# Patient Record
Sex: MALE | Race: WHITE | NOT HISPANIC OR LATINO | URBAN - METROPOLITAN AREA
[De-identification: names, ages, dates, MRNs, and addresses within clinical notes are randomized per-mention and may not be internally consistent; named-entity substitution may affect disease eponyms.]

---

## 2020-06-02 ENCOUNTER — TELEPHONE ENCOUNTER (OUTPATIENT)
Dept: URBAN - METROPOLITAN AREA CLINIC 68 | Facility: CLINIC | Age: 70
End: 2020-06-02

## 2020-06-09 LAB
ALBUMIN/GLOBULIN RATIO: (no result)
ALBUMIN/GLOBULIN RATIO: (no result)
ALBUMIN: (no result)
ALBUMIN: (no result)
ALKALINE PHOSPHATASE: (no result)
ALKALINE PHOSPHATASE: (no result)
ALT: (no result)
ALT: (no result)
AST: (no result)
AST: (no result)
BILIRUBIN, TOTAL: (no result)
BILIRUBIN, TOTAL: (no result)
BUN/CREATININE RATIO: (no result)
BUN/CREATININE RATIO: (no result)
CALCIUM: (no result)
CALCIUM: (no result)
CARBON DIOXIDE: (no result)
CARBON DIOXIDE: (no result)
CHLORIDE: (no result)
CHLORIDE: (no result)
CREATININE: (no result)
CREATININE: (no result)
EGFR AFRICAN AMERICAN: (no result)
EGFR AFRICAN AMERICAN: (no result)
EGFR NON-AFR. AMERICAN: (no result)
EGFR NON-AFR. AMERICAN: (no result)
GLOBULIN: (no result)
GLOBULIN: (no result)
GLUCOSE: (no result)
GLUCOSE: (no result)
HEMATOCRIT: (no result)
HEMATOCRIT: (no result)
HEMOGLOBIN: (no result)
HEMOGLOBIN: (no result)
MCH: (no result)
MCH: (no result)
MCHC: (no result)
MCHC: (no result)
MCV: (no result)
MCV: (no result)
MPV: (no result)
MPV: (no result)
PLATELET COUNT: (no result)
PLATELET COUNT: (no result)
POTASSIUM: (no result)
POTASSIUM: (no result)
PROTEIN, TOTAL: (no result)
PROTEIN, TOTAL: (no result)
RDW: (no result)
RDW: (no result)
RED BLOOD CELL COUNT: (no result)
RED BLOOD CELL COUNT: (no result)
SED RATE BY MODIFIED WESTERGREN: (no result)
SED RATE BY MODIFIED WESTERGREN: (no result)
SODIUM: (no result)
SODIUM: (no result)
UREA NITROGEN (BUN): (no result)
UREA NITROGEN (BUN): (no result)
WHITE BLOOD CELL COUNT: (no result)
WHITE BLOOD CELL COUNT: (no result)

## 2020-06-10 ENCOUNTER — TELEPHONE ENCOUNTER (OUTPATIENT)
Dept: URBAN - METROPOLITAN AREA CLINIC 68 | Facility: CLINIC | Age: 70
End: 2020-06-10

## 2020-06-10 ENCOUNTER — LAB OUTSIDE AN ENCOUNTER (OUTPATIENT)
Dept: URBAN - METROPOLITAN AREA CLINIC 68 | Facility: CLINIC | Age: 70
End: 2020-06-10

## 2020-07-21 ENCOUNTER — TELEPHONE ENCOUNTER (OUTPATIENT)
Dept: URBAN - METROPOLITAN AREA CLINIC 68 | Facility: CLINIC | Age: 70
End: 2020-07-21

## 2020-07-28 ENCOUNTER — TELEPHONE ENCOUNTER (OUTPATIENT)
Dept: URBAN - METROPOLITAN AREA CLINIC 68 | Facility: CLINIC | Age: 70
End: 2020-07-28

## 2020-07-30 ENCOUNTER — TELEPHONE ENCOUNTER (OUTPATIENT)
Dept: URBAN - METROPOLITAN AREA CLINIC 68 | Facility: CLINIC | Age: 70
End: 2020-07-30

## 2020-07-31 LAB — COPY(IES) SENT TO:: (no result)

## 2020-08-01 ENCOUNTER — LAB OUTSIDE AN ENCOUNTER (OUTPATIENT)
Dept: URBAN - METROPOLITAN AREA CLINIC 68 | Facility: CLINIC | Age: 70
End: 2020-08-01

## 2020-08-01 LAB
HEMATOCRIT: (no result)
HEMOGLOBIN: (no result)
MCH: (no result)
MCHC: (no result)
MCV: (no result)
MPV: (no result)
PLATELET COUNT: (no result)
RDW: (no result)
RED BLOOD CELL COUNT: (no result)
WHITE BLOOD CELL COUNT: (no result)

## 2020-08-03 ENCOUNTER — OFFICE VISIT (OUTPATIENT)
Dept: URBAN - METROPOLITAN AREA CLINIC 68 | Facility: CLINIC | Age: 70
End: 2020-08-03

## 2020-08-03 ENCOUNTER — TELEPHONE ENCOUNTER (OUTPATIENT)
Dept: URBAN - METROPOLITAN AREA CLINIC 68 | Facility: CLINIC | Age: 70
End: 2020-08-03

## 2020-08-03 LAB
HEMATOCRIT: (no result)
HEMATOCRIT: (no result)
HEMOGLOBIN: (no result)
HEMOGLOBIN: (no result)
MCH: (no result)
MCH: (no result)
MCHC: (no result)
MCHC: (no result)
MCV: (no result)
MCV: (no result)
MPV: (no result)
MPV: (no result)
PLATELET COUNT: (no result)
PLATELET COUNT: (no result)
RDW: (no result)
RDW: (no result)
RED BLOOD CELL COUNT: (no result)
RED BLOOD CELL COUNT: (no result)
WHITE BLOOD CELL COUNT: (no result)
WHITE BLOOD CELL COUNT: (no result)

## 2020-08-04 ENCOUNTER — LAB OUTSIDE AN ENCOUNTER (OUTPATIENT)
Dept: URBAN - METROPOLITAN AREA CLINIC 68 | Facility: CLINIC | Age: 70
End: 2020-08-04

## 2020-08-04 ENCOUNTER — TELEPHONE ENCOUNTER (OUTPATIENT)
Dept: URBAN - METROPOLITAN AREA CLINIC 68 | Facility: CLINIC | Age: 70
End: 2020-08-04

## 2020-08-05 ENCOUNTER — TELEPHONE ENCOUNTER (OUTPATIENT)
Dept: URBAN - METROPOLITAN AREA CLINIC 68 | Facility: CLINIC | Age: 70
End: 2020-08-05

## 2020-08-06 ENCOUNTER — OFFICE VISIT (OUTPATIENT)
Dept: URBAN - METROPOLITAN AREA MEDICAL CENTER 21 | Facility: MEDICAL CENTER | Age: 70
End: 2020-08-06

## 2020-08-10 LAB
COPY(IES) SENT TO:: (no result)
COPY(IES) SENT TO:: (no result)
SED RATE BY MODIFIED WESTERGREN: (no result)
SED RATE BY MODIFIED WESTERGREN: (no result)

## 2020-08-11 ENCOUNTER — LAB OUTSIDE AN ENCOUNTER (OUTPATIENT)
Dept: URBAN - METROPOLITAN AREA CLINIC 68 | Facility: CLINIC | Age: 70
End: 2020-08-11

## 2020-08-11 ENCOUNTER — TELEPHONE ENCOUNTER (OUTPATIENT)
Dept: URBAN - METROPOLITAN AREA CLINIC 68 | Facility: CLINIC | Age: 70
End: 2020-08-11

## 2020-08-11 LAB
ALBUMIN/GLOBULIN RATIO: (no result)
ALBUMIN/GLOBULIN RATIO: (no result)
ALBUMIN: (no result)
ALBUMIN: (no result)
ALKALINE PHOSPHATASE: (no result)
ALKALINE PHOSPHATASE: (no result)
ALT: (no result)
ALT: (no result)
AST: (no result)
AST: (no result)
BILIRUBIN, TOTAL: (no result)
BILIRUBIN, TOTAL: (no result)
BUN/CREATININE RATIO: (no result)
BUN/CREATININE RATIO: (no result)
CALCIUM: (no result)
CALCIUM: (no result)
CARBON DIOXIDE: (no result)
CARBON DIOXIDE: (no result)
CHLORIDE: (no result)
CHLORIDE: (no result)
CREATININE: (no result)
CREATININE: (no result)
EGFR AFRICAN AMERICAN: (no result)
EGFR AFRICAN AMERICAN: (no result)
EGFR NON-AFR. AMERICAN: (no result)
EGFR NON-AFR. AMERICAN: (no result)
GLOBULIN: (no result)
GLOBULIN: (no result)
GLUCOSE: (no result)
GLUCOSE: (no result)
HEMATOCRIT: (no result)
HEMATOCRIT: (no result)
HEMOGLOBIN: (no result)
HEMOGLOBIN: (no result)
MCH: (no result)
MCH: (no result)
MCHC: (no result)
MCHC: (no result)
MCV: (no result)
MCV: (no result)
MPV: (no result)
MPV: (no result)
PLATELET COUNT: (no result)
PLATELET COUNT: (no result)
POTASSIUM: (no result)
POTASSIUM: (no result)
PROTEIN, TOTAL: (no result)
PROTEIN, TOTAL: (no result)
RDW: (no result)
RDW: (no result)
RED BLOOD CELL COUNT: (no result)
RED BLOOD CELL COUNT: (no result)
SODIUM: (no result)
SODIUM: (no result)
UREA NITROGEN (BUN): (no result)
UREA NITROGEN (BUN): (no result)
WHITE BLOOD CELL COUNT: (no result)
WHITE BLOOD CELL COUNT: (no result)

## 2020-08-19 ENCOUNTER — TELEPHONE ENCOUNTER (OUTPATIENT)
Dept: URBAN - METROPOLITAN AREA CLINIC 68 | Facility: CLINIC | Age: 70
End: 2020-08-19

## 2020-08-20 LAB
COPY(IES) SENT TO:: (no result)
COPY(IES) SENT TO:: (no result)

## 2020-08-21 ENCOUNTER — LAB OUTSIDE AN ENCOUNTER (OUTPATIENT)
Dept: URBAN - METROPOLITAN AREA CLINIC 68 | Facility: CLINIC | Age: 70
End: 2020-08-21

## 2020-08-21 ENCOUNTER — TELEPHONE ENCOUNTER (OUTPATIENT)
Dept: URBAN - METROPOLITAN AREA CLINIC 68 | Facility: CLINIC | Age: 70
End: 2020-08-21

## 2020-08-26 ENCOUNTER — OFFICE VISIT (OUTPATIENT)
Dept: URBAN - METROPOLITAN AREA CLINIC 68 | Facility: CLINIC | Age: 70
End: 2020-08-26

## 2020-09-02 ENCOUNTER — TELEPHONE ENCOUNTER (OUTPATIENT)
Dept: URBAN - METROPOLITAN AREA CLINIC 68 | Facility: CLINIC | Age: 70
End: 2020-09-02

## 2020-09-05 ENCOUNTER — LAB OUTSIDE AN ENCOUNTER (OUTPATIENT)
Dept: URBAN - METROPOLITAN AREA CLINIC 68 | Facility: CLINIC | Age: 70
End: 2020-09-05

## 2020-09-10 ENCOUNTER — OFFICE VISIT (OUTPATIENT)
Dept: URBAN - METROPOLITAN AREA MEDICAL CENTER 21 | Facility: MEDICAL CENTER | Age: 70
End: 2020-09-10

## 2020-09-15 ENCOUNTER — TELEPHONE ENCOUNTER (OUTPATIENT)
Dept: URBAN - METROPOLITAN AREA CLINIC 68 | Facility: CLINIC | Age: 70
End: 2020-09-15

## 2020-09-18 ENCOUNTER — OFFICE VISIT (OUTPATIENT)
Dept: URBAN - METROPOLITAN AREA CLINIC 68 | Facility: CLINIC | Age: 70
End: 2020-09-18

## 2020-10-19 ENCOUNTER — OFFICE VISIT (OUTPATIENT)
Dept: URBAN - METROPOLITAN AREA CLINIC 68 | Facility: CLINIC | Age: 70
End: 2020-10-19

## 2020-10-27 ENCOUNTER — TELEPHONE ENCOUNTER (OUTPATIENT)
Dept: URBAN - METROPOLITAN AREA CLINIC 68 | Facility: CLINIC | Age: 70
End: 2020-10-27

## 2020-11-02 LAB — DUPLICATE REPORT REQUEST: (no result)

## 2020-11-03 ENCOUNTER — LAB OUTSIDE AN ENCOUNTER (OUTPATIENT)
Dept: URBAN - METROPOLITAN AREA CLINIC 68 | Facility: CLINIC | Age: 70
End: 2020-11-03

## 2020-11-03 LAB
ALBUMIN/GLOBULIN RATIO: (no result)
ALBUMIN: (no result)
ALKALINE PHOSPHATASE: (no result)
ALT: (no result)
AST: (no result)
BILIRUBIN, TOTAL: (no result)
BUN/CREATININE RATIO: (no result)
CALCIUM: (no result)
CARBON DIOXIDE: (no result)
CHLORIDE: (no result)
CREATININE: (no result)
EGFR AFRICAN AMERICAN: (no result)
EGFR NON-AFR. AMERICAN: (no result)
GLOBULIN: (no result)
GLUCOSE: (no result)
HEMATOCRIT: (no result)
HEMOGLOBIN: (no result)
MCH: (no result)
MCHC: (no result)
MCV: (no result)
MPV: (no result)
PLATELET COUNT: (no result)
POTASSIUM: (no result)
PROTEIN, TOTAL: (no result)
RDW: (no result)
RED BLOOD CELL COUNT: (no result)
SODIUM: (no result)
UREA NITROGEN (BUN): (no result)
WHITE BLOOD CELL COUNT: (no result)

## 2020-11-05 ENCOUNTER — TELEPHONE ENCOUNTER (OUTPATIENT)
Dept: URBAN - METROPOLITAN AREA CLINIC 68 | Facility: CLINIC | Age: 70
End: 2020-11-05

## 2020-12-22 ENCOUNTER — LAB OUTSIDE AN ENCOUNTER (OUTPATIENT)
Dept: URBAN - METROPOLITAN AREA CLINIC 68 | Facility: CLINIC | Age: 70
End: 2020-12-22

## 2020-12-29 ENCOUNTER — TELEPHONE ENCOUNTER (OUTPATIENT)
Dept: URBAN - METROPOLITAN AREA CLINIC 68 | Facility: CLINIC | Age: 70
End: 2020-12-29

## 2021-04-06 ENCOUNTER — OFFICE VISIT (OUTPATIENT)
Age: 71
End: 2021-04-06

## 2022-05-06 ENCOUNTER — NEW PATIENT (OUTPATIENT)
Dept: URBAN - METROPOLITAN AREA CLINIC 33 | Facility: CLINIC | Age: 72
End: 2022-05-06

## 2022-05-06 VITALS
DIASTOLIC BLOOD PRESSURE: 96 MMHG | BODY MASS INDEX: 25.62 KG/M2 | SYSTOLIC BLOOD PRESSURE: 164 MMHG | HEIGHT: 70 IN | HEART RATE: 62 BPM | WEIGHT: 179 LBS

## 2022-05-06 DIAGNOSIS — H35.62: ICD-10-CM

## 2022-05-06 DIAGNOSIS — H04.123: ICD-10-CM

## 2022-05-06 DIAGNOSIS — H35.81: ICD-10-CM

## 2022-05-06 DIAGNOSIS — H35.052: ICD-10-CM

## 2022-05-06 DIAGNOSIS — Z79.4: ICD-10-CM

## 2022-05-06 DIAGNOSIS — E11.9: ICD-10-CM

## 2022-05-06 DIAGNOSIS — D86.9: ICD-10-CM

## 2022-05-06 DIAGNOSIS — H40.013: ICD-10-CM

## 2022-05-06 DIAGNOSIS — H35.371: ICD-10-CM

## 2022-05-06 PROCEDURE — 92235 FLUORESCEIN ANGRPH MLTIFRAME: CPT

## 2022-05-06 PROCEDURE — 99204 OFFICE O/P NEW MOD 45 MIN: CPT

## 2022-05-06 PROCEDURE — 92134 CPTRZ OPH DX IMG PST SGM RTA: CPT

## 2022-05-06 ASSESSMENT — TONOMETRY
OS_IOP_MMHG: 10
OD_IOP_MMHG: 9

## 2022-05-06 ASSESSMENT — VISUAL ACUITY
OD_SC: 20/100-1
OD_PH: 20/40-1

## 2022-05-06 NOTE — PATIENT DISCUSSION
5/6/22: SUSPECTED CNV, HOWEVER GIVEN OPTIC NERVE EDEMA, WILL RULE OUT INFECTIONS FIRST-WITH SIGNIFICANT HISTORY OF AUTOIMMUNE DX + DIABETES-BEFORE CONSIDERING ANTIVEGF TREATMENT.  IContinue to MONITOR CLOSELY to determine the need for TREATMENT and INCREASE/DECREASE in length of time till next follow up visit.

## 2022-06-04 ENCOUNTER — TELEPHONE ENCOUNTER (OUTPATIENT)
Dept: URBAN - METROPOLITAN AREA CLINIC 68 | Facility: CLINIC | Age: 72
End: 2022-06-04

## 2022-06-04 RX ORDER — COLCHICINE 0.6 MG/1
COLCHICINE( 0.6MG ORAL 1/2 AS NEEDED ) DISCONTINUED -HX ENTRY TABLET, FILM COATED ORAL AS NEEDED
OUTPATIENT
Start: 2020-10-19 | End: 2020-10-19

## 2022-06-04 RX ORDER — CLOPIDOGREL 75 MG/1
CLOPIDOGREL BISULFATE( 75MG ORAL  DAILY ) INACTIVE -HX ENTRY TABLET ORAL DAILY
OUTPATIENT
Start: 2019-12-04

## 2022-06-04 RX ORDER — ALLOPURINOL 300 MG/1
ALLOPURINOL( 300MG ORAL  DAILY ) DISCONTINUED -HX ENTRY TABLET ORAL DAILY
OUTPATIENT
Start: 2020-10-19 | End: 2020-10-19

## 2022-06-04 RX ORDER — TRANDOLAPRIL 1 MG/1
TRANDOLAPRIL( 1MG ORAL  DAILY ) INACTIVE -HX ENTRY TABLET ORAL DAILY
OUTPATIENT
Start: 2019-12-04

## 2022-06-04 RX ORDER — METFORMIN HYDROCHLORIDE 500 MG/1
METFORMIN HCL( 500MG ORAL  TWO TIMES DAILY ) INACTIVE -HX ENTRY TABLET, COATED ORAL
OUTPATIENT
Start: 2019-12-04

## 2022-06-04 RX ORDER — ALBUTEROL SULFATE 90 UG/1
PROAIR HFA( 108 (90 BASE)MCG/ACT INHALATION   ) UNDEFINED -HX ENTRY AEROSOL, METERED RESPIRATORY (INHALATION)
OUTPATIENT
Start: 2017-03-10

## 2022-06-04 RX ORDER — PANTOPRAZOLE SODIUM 40 MG/1
PANTOPRAZOLE SODIUM( 40MG ORAL 1 EVERY TWENTY-FOUR HOURS ) UNDEFINED -HX ENTRY TABLET, DELAYED RELEASE ORAL
OUTPATIENT
Start: 2017-03-10

## 2022-06-04 RX ORDER — NITROGLYCERIN 0.4 MG/1
NITROGLYCERIN( 0.4MG SUBLINGUAL   ) UNDEFINED -HX ENTRY TABLET SUBLINGUAL
OUTPATIENT
Start: 2017-03-10

## 2022-06-04 RX ORDER — OMEPRAZOLE 20 MG/1
CAPSULE, DELAYED RELEASE ORAL DAILY
Qty: 14 | Refills: 0 | OUTPATIENT
Start: 2020-05-02 | End: 2020-05-16

## 2022-06-04 RX ORDER — WARFARIN 7.5 MG/1
WARFARIN SODIUM( 7.5MG ORAL  4 X/WEEK ) DISCONTINUED -HX ENTRY TABLET ORAL
OUTPATIENT
Start: 2020-10-19 | End: 2020-10-19

## 2022-06-04 RX ORDER — DILTIAZEM HYDROCHLORIDE 240 MG/1
TAZTIA XT( 240MG ORAL  DAILY ) INACTIVE -HX ENTRY CAPSULE, EXTENDED RELEASE ORAL DAILY
OUTPATIENT
Start: 2019-12-04

## 2022-06-04 RX ORDER — METHYLPREDNISOLONE 4 MG/1
METHYLPREDNISOLONE( 4MG ORAL  DAILY ) DISCONTINUED -HX ENTRY TABLET ORAL DAILY
OUTPATIENT
Start: 2020-10-19 | End: 2020-10-19

## 2022-06-04 RX ORDER — ASPIRIN 81 MG/1
ASPIRIN( 81MG ORAL 1  ) UNDEFINED -HX ENTRY TABLET, COATED ORAL
OUTPATIENT
Start: 2017-03-10

## 2022-06-04 RX ORDER — INFLIXIMAB 100 MG/10ML
INJECTION, POWDER, LYOPHILIZED, FOR SOLUTION INTRAVENOUS
OUTPATIENT
Start: 2020-07-21

## 2022-06-05 ENCOUNTER — TELEPHONE ENCOUNTER (OUTPATIENT)
Dept: URBAN - METROPOLITAN AREA CLINIC 68 | Facility: CLINIC | Age: 72
End: 2022-06-05

## 2022-06-05 RX ORDER — GOLIMUMAB 50 MG/4ML
SIMPONI ARIA( 50MG/4ML INTRAVENOUS  AS DIRECTED ) ACTIVE -HX ENTRY SOLUTION INTRAVENOUS AS DIRECTED
Status: ACTIVE | COMMUNITY
Start: 2020-09-18

## 2022-06-05 RX ORDER — LEVOTHYROXINE SODIUM 125 UG/1
LEVOTHYROXINE SODIUM( 125MCG ORAL  DAILY ) ACTIVE -HX ENTRY TABLET ORAL DAILY
Status: ACTIVE | COMMUNITY
Start: 2020-10-19

## 2022-06-05 RX ORDER — EZETIMIBE 10 MG/1
ZETIA( 10MG ORAL  DAILY ) ACTIVE -HX ENTRY TABLET ORAL DAILY
Status: ACTIVE | COMMUNITY
Start: 2020-10-19

## 2022-06-05 RX ORDER — INSULIN LISPRO 100 [IU]/ML
HUMALOG( 100UNIT/ML SUBCUTANEOUS  AS DIRECTED ) ACTIVE -HX ENTRY INJECTION, SOLUTION INTRAVENOUS; SUBCUTANEOUS AS DIRECTED
Status: ACTIVE | COMMUNITY
Start: 2020-10-19

## 2022-06-05 RX ORDER — CARVEDILOL 12.5 MG/1
CARVEDILOL( 12.5MG ORAL 1/2 TABLET TWO TIMES DAILY ) ACTIVE -HX ENTRY TABLET, FILM COATED ORAL
Status: ACTIVE | COMMUNITY
Start: 2020-10-19

## 2022-06-05 RX ORDER — ALENDRONATE SODIUM 70 MG/1
ALENDRONATE SODIUM( 70MG ORAL  WEEKLY ) ACTIVE -HX ENTRY TABLET ORAL WEEKLY
Status: ACTIVE | COMMUNITY
Start: 2020-10-19

## 2022-06-05 RX ORDER — FUROSEMIDE 80 MG/1
LASIX( 80MG ORAL  DAILY ) ACTIVE -HX ENTRY TABLET ORAL DAILY
Status: ACTIVE | COMMUNITY
Start: 2020-10-19

## 2022-06-05 RX ORDER — INSULIN DETEMIR 100 [IU]/ML
LEVEMIR( 100UNIT/ML SUBCUTANEOUS 26UNITS DAILY ) ACTIVE -HX ENTRY INJECTION, SOLUTION SUBCUTANEOUS DAILY
Status: ACTIVE | COMMUNITY
Start: 2020-10-19

## 2022-06-05 RX ORDER — SACUBITRIL AND VALSARTAN 49; 51 MG/1; MG/1
ENTRESTO( 49-51MG ORAL  TWO TIMES DAILY ) ACTIVE -HX ENTRY TABLET, FILM COATED ORAL
Status: ACTIVE | COMMUNITY
Start: 2020-10-19

## 2022-06-05 RX ORDER — PREDNISONE 10 MG/1
PREDNISONE( 10MG ORAL 3 TABLETS DAILY ) ACTIVE -HX ENTRY TABLET ORAL DAILY
Status: ACTIVE | COMMUNITY
Start: 2020-10-19

## 2022-06-05 RX ORDER — PRAVASTATIN SODIUM 80 MG/1
PRAVASTATIN SODIUM( 80MG ORAL  DAILY ) ACTIVE -HX ENTRY TABLET ORAL DAILY
Status: ACTIVE | COMMUNITY
Start: 2020-10-19

## 2022-06-05 RX ORDER — WARFARIN 5 MG/1
WARFARIN SODIUM( 5MG ORAL  THREE TIMES A WEEK ) ACTIVE -HX ENTRY TABLET ORAL
Status: ACTIVE | COMMUNITY
Start: 2020-09-18

## 2022-06-05 RX ORDER — NITROGLYCERIN 0.4 MG/1
NITROSTAT( 0.4MG SUBLINGUAL  AS NEEDED ) ACTIVE -HX ENTRY TABLET SUBLINGUAL AS NEEDED
Status: ACTIVE | COMMUNITY
Start: 2020-10-19

## 2022-06-05 RX ORDER — PANTOPRAZOLE SODIUM 40 MG/1
TABLET, DELAYED RELEASE ORAL
Qty: 180 | Refills: 180 | Status: ACTIVE | COMMUNITY
Start: 2021-10-31

## 2022-06-10 ENCOUNTER — FOLLOW UP (OUTPATIENT)
Dept: URBAN - METROPOLITAN AREA CLINIC 33 | Facility: CLINIC | Age: 72
End: 2022-06-10

## 2022-06-10 DIAGNOSIS — H35.371: ICD-10-CM

## 2022-06-10 DIAGNOSIS — H35.62: ICD-10-CM

## 2022-06-10 DIAGNOSIS — H35.052: ICD-10-CM

## 2022-06-10 DIAGNOSIS — E11.9: ICD-10-CM

## 2022-06-10 DIAGNOSIS — Z79.4: ICD-10-CM

## 2022-06-10 DIAGNOSIS — H04.123: ICD-10-CM

## 2022-06-10 DIAGNOSIS — H35.81: ICD-10-CM

## 2022-06-10 DIAGNOSIS — D86.9: ICD-10-CM

## 2022-06-10 DIAGNOSIS — H40.013: ICD-10-CM

## 2022-06-10 PROCEDURE — 67028 INJECTION EYE DRUG: CPT

## 2022-06-10 PROCEDURE — J3490AVA AVASTIN

## 2022-06-10 PROCEDURE — 92012 INTRM OPH EXAM EST PATIENT: CPT

## 2022-06-10 PROCEDURE — 92134 CPTRZ OPH DX IMG PST SGM RTA: CPT

## 2022-06-10 ASSESSMENT — TONOMETRY
OD_IOP_MMHG: 11
OS_IOP_MMHG: 13

## 2022-06-10 ASSESSMENT — VISUAL ACUITY: OD_CC: 20/40

## 2022-06-10 NOTE — PROCEDURE NOTE: CLINICAL
PROCEDURE NOTE: Avastin () OS. Diagnosis: Choroidal Neovascular Membrane, Peripapillary. Anesthesia: Lidocaine 4%. Prep: Betadine Drops. Prior to injection, risks/benefits/alternatives discussed including infection, loss of vision, hemorrhage, cataract, glaucoma, retinal tears or detachment. The off-label status of Intravitreal Avastin also was reviewed. The patient wished to proceed with treatment. Topical anesthesia was induced with Alcaine. Additional anesthesia was achieved using drop(s) or injection checked above. a drop of Povidone-iodine 5% ophthalmic solution was instilled over the injection site and in the inferior fornix. Betadine prep was performed. Using the syringe provided, Avastin 1.25 mg in 0.05 cc was injected into the vitreous cavity. The needle was passed 3.0 mm posterior to the limbus in pseudophakic patients, and 3.5 mm posterior to the lumbus in phakic patients. The remainder of the Avastin in the single-use vial was then discarded in a medical waste disposal container. Unused medication was discarded. Patient tolerated procedure well. There were no complications. Injection time: 445 PM. Post-op instructions given. Expiration Date: 8/25/2022. The patient was instructed to return for re-evaluation in approximately 4-12 weeks depending on his/her condition and was told to call immediately if vision decreases and/or if his/her eye becomes red, painful, and/or light sensitive. The patient was instructed to go to the emergency room or call 911 if unable to reach the doctor within an hour or two of trying or calling. The patient was instructed to use Artificial Tears q.i.d. p.r.n for comfort. Satnam Haines

## 2022-06-14 ENCOUNTER — OFFICE VISIT (OUTPATIENT)
Dept: URBAN - METROPOLITAN AREA CLINIC 68 | Facility: CLINIC | Age: 72
End: 2022-06-14

## 2022-06-20 ENCOUNTER — OFFICE VISIT (OUTPATIENT)
Dept: URBAN - METROPOLITAN AREA CLINIC 68 | Facility: CLINIC | Age: 72
End: 2022-06-20

## 2022-06-25 ENCOUNTER — TELEPHONE ENCOUNTER (OUTPATIENT)
Age: 72
End: 2022-06-25

## 2022-06-25 RX ORDER — METFORMIN HCL 500 MG/1
METFORMIN HCL( 500MG ORAL  TWO TIMES DAILY ) INACTIVE -HX ENTRY TABLET ORAL
OUTPATIENT
Start: 2019-12-04

## 2022-06-25 RX ORDER — PANTOPRAZOLE 40 MG/1
PANTOPRAZOLE SODIUM( 40MG ORAL 1 EVERY TWENTY-FOUR HOURS ) UNDEFINED -HX ENTRY TABLET, DELAYED RELEASE ORAL
OUTPATIENT
Start: 2017-03-10

## 2022-06-25 RX ORDER — DILTIAZEM HYDROCHLORIDE EXTENDED RELEASE 240 MG/1
DILTIAZEM CD( 240MG ORAL   ) UNDEFINED -HX ENTRY CAPSULE, EXTENDED RELEASE ORAL
OUTPATIENT
Start: 2017-03-10

## 2022-06-25 RX ORDER — METHYLPREDNISOLONE 4 MG/1
METHYLPREDNISOLONE( 4MG ORAL  DAILY ) DISCONTINUED -HX ENTRY TABLET ORAL DAILY
OUTPATIENT
Start: 2020-10-19 | End: 2020-10-19

## 2022-06-25 RX ORDER — ALENDRONATE SODIUM 70 MG/1
ALENDRONATE SODIUM( 70MG ORAL  WEEKLY ) INACTIVE -HX ENTRY TABLET ORAL WEEKLY
OUTPATIENT
Start: 2022-06-14

## 2022-06-25 RX ORDER — PRAVASTATIN SODIUM 80 MG/1
PRAVASTATIN SODIUM( 80MG ORAL  DAILY ) INACTIVE -HX ENTRY TABLET ORAL DAILY
OUTPATIENT
Start: 2022-06-14

## 2022-06-25 RX ORDER — OMEPRAZOLE 20 MG/1
CAPSULE, DELAYED RELEASE ORAL DAILY
Qty: 14 | Refills: 0 | OUTPATIENT
Start: 2020-05-02 | End: 2020-05-16

## 2022-06-25 RX ORDER — WARFARIN 7.5 MG/1
WARFARIN SODIUM( 7.5MG ORAL  4 X/WEEK ) DISCONTINUED -HX ENTRY TABLET ORAL
OUTPATIENT
Start: 2020-10-19 | End: 2020-10-19

## 2022-06-25 RX ORDER — TRANDOLAPRIL 1 MG/1
TRANDOLAPRIL( 1MG ORAL  DAILY ) INACTIVE -HX ENTRY TABLET ORAL DAILY
OUTPATIENT
Start: 2019-12-04

## 2022-06-25 RX ORDER — INFLIXIMAB 100 MG/10ML
INJECTION, POWDER, LYOPHILIZED, FOR SOLUTION INTRAVENOUS
OUTPATIENT
Start: 2020-07-21

## 2022-06-25 RX ORDER — CLOPIDOGREL BISULFATE 75 MG/1
CLOPIDOGREL BISULFATE( 75MG ORAL  DAILY ) INACTIVE -HX ENTRY TABLET, FILM COATED ORAL DAILY
OUTPATIENT
Start: 2019-12-04

## 2022-06-25 RX ORDER — GOLIMUMAB 50 MG/4ML
SIMPONI ARIA( 50MG/4ML INTRAVENOUS  AS DIRECTED ) INACTIVE -HX ENTRY SOLUTION INTRAVENOUS AS DIRECTED
OUTPATIENT
Start: 2022-06-14

## 2022-06-25 RX ORDER — NITROGLYCERIN 0.4 MG/1
NITROGLYCERIN( 0.4MG SUBLINGUAL   ) UNDEFINED -HX ENTRY TABLET SUBLINGUAL
OUTPATIENT
Start: 2017-03-10

## 2022-06-25 RX ORDER — SACUBITRIL AND VALSARTAN 49; 51 MG/1; MG/1
ENTRESTO( 49-51MG ORAL  TWO TIMES DAILY ) INACTIVE -HX ENTRY TABLET, FILM COATED ORAL
OUTPATIENT
Start: 2022-06-14

## 2022-06-25 RX ORDER — COLCHICINE 0.6 MG/1
COLCHICINE( 0.6MG ORAL 1/2 AS NEEDED ) DISCONTINUED -HX ENTRY TABLET ORAL AS NEEDED
OUTPATIENT
Start: 2020-10-19 | End: 2020-10-19

## 2022-06-25 RX ORDER — ASPIRIN 81 MG/1
ASPIRIN( 81MG ORAL 1  ) UNDEFINED -HX ENTRY TABLET, COATED ORAL
OUTPATIENT
Start: 2017-03-10

## 2022-06-25 RX ORDER — EZETIMIBE 10 MG/1
ZETIA( 10MG ORAL  DAILY ) INACTIVE -HX ENTRY TABLET ORAL DAILY
OUTPATIENT
Start: 2022-06-14

## 2022-06-25 RX ORDER — DILTIAZEM HYDROCHLORIDE 240 MG/1
TAZTIA XT( 240MG ORAL  DAILY ) INACTIVE -HX ENTRY CAPSULE, EXTENDED RELEASE ORAL DAILY
OUTPATIENT
Start: 2019-12-04

## 2022-06-25 RX ORDER — ALLOPURINOL 300 MG/1
ALLOPURINOL( 300MG ORAL  DAILY ) DISCONTINUED -HX ENTRY TABLET ORAL DAILY
OUTPATIENT
Start: 2020-10-19 | End: 2020-10-19

## 2022-06-26 ENCOUNTER — TELEPHONE ENCOUNTER (OUTPATIENT)
Age: 72
End: 2022-06-26

## 2022-06-26 RX ORDER — LEVOTHYROXINE SODIUM 125 UG/1
LEVOTHYROXINE SODIUM( 125MCG ORAL  DAILY ) ACTIVE -HX ENTRY TABLET ORAL DAILY
Status: ACTIVE | COMMUNITY
Start: 2022-06-14

## 2022-06-26 RX ORDER — NITROGLYCERIN 0.4 MG/1
NITROSTAT( 0.4MG SUBLINGUAL  AS NEEDED ) ACTIVE -HX ENTRY TABLET SUBLINGUAL AS NEEDED
Status: ACTIVE | COMMUNITY
Start: 2022-06-14

## 2022-06-26 RX ORDER — INSULIN DETEMIR 100 [IU]/ML
LEVEMIR( 100UNIT/ML SUBCUTANEOUS 26UNITS DAILY ) ACTIVE -HX ENTRY INJECTION, SOLUTION SUBCUTANEOUS DAILY
Status: ACTIVE | COMMUNITY
Start: 2022-06-14

## 2022-06-26 RX ORDER — ONDANSETRON HYDROCHLORIDE 4 MG/1
ONDANSETRON( 4MG ORAL 1 DAILY ) ACTIVE -HX ENTRY TABLET, FILM COATED ORAL DAILY
Status: ACTIVE | COMMUNITY
Start: 2022-06-14

## 2022-06-26 RX ORDER — PREDNISONE 10 MG/1
PREDNISONE( 10MG ORAL 3 TABLETS DAILY ) ACTIVE -HX ENTRY TABLET ORAL DAILY
Status: ACTIVE | COMMUNITY
Start: 2022-06-14

## 2022-06-26 RX ORDER — FUROSEMIDE 80 MG/1
FUROSEMIDE( 80MG ORAL 1 DAILY ) ACTIVE -HX ENTRY TABLET ORAL DAILY
Status: ACTIVE | COMMUNITY
Start: 2022-06-14

## 2022-06-26 RX ORDER — FEBUXOSTAT 40 MG/1
FEBUXOSTAT( 40MG ORAL 1 DAILY ) ACTIVE -HX ENTRY TABLET, FILM COATED ORAL DAILY
Status: ACTIVE | COMMUNITY
Start: 2022-06-14

## 2022-06-26 RX ORDER — FUROSEMIDE 80 MG/1
LASIX( 80MG ORAL  DAILY ) ACTIVE -HX ENTRY TABLET ORAL DAILY
Status: ACTIVE | COMMUNITY
Start: 2022-06-14

## 2022-06-26 RX ORDER — BRIMONIDINE TARTRATE 2 MG/MG
BRIMONIDINE TARTRATE( 0.2% OPHTHALMIC 2 DAILY ) ACTIVE -HX ENTRY SOLUTION/ DROPS OPHTHALMIC DAILY
Status: ACTIVE | COMMUNITY
Start: 2022-06-14

## 2022-06-26 RX ORDER — WARFARIN 5 MG/1
WARFARIN SODIUM( 5MG ORAL  THREE TIMES A WEEK ) ACTIVE -HX ENTRY TABLET ORAL
Status: ACTIVE | COMMUNITY
Start: 2022-06-14

## 2022-06-26 RX ORDER — PANTOPRAZOLE 40 MG/1
TABLET, DELAYED RELEASE ORAL
Qty: 180 | Refills: 180 | Status: ACTIVE | COMMUNITY
Start: 2021-10-31

## 2022-06-26 RX ORDER — CARVEDILOL 12.5 MG/1
CARVEDILOL( 12.5MG ORAL 1/2 TABLET TWO TIMES DAILY ) ACTIVE -HX ENTRY TABLET, FILM COATED ORAL
Status: ACTIVE | COMMUNITY
Start: 2022-06-14

## 2022-06-26 RX ORDER — INSULIN LISPRO 100 [IU]/ML
HUMALOG( 100UNIT/ML SUBCUTANEOUS  AS DIRECTED ) ACTIVE -HX ENTRY INJECTION, SOLUTION INTRAVENOUS; SUBCUTANEOUS AS DIRECTED
Status: ACTIVE | COMMUNITY
Start: 2022-06-14

## 2022-06-30 ENCOUNTER — LAB OUTSIDE AN ENCOUNTER (OUTPATIENT)
Dept: URBAN - METROPOLITAN AREA CLINIC 68 | Facility: CLINIC | Age: 72
End: 2022-06-30

## 2022-07-01 LAB
ALBUMIN/GLOBULIN RATIO: 1.2
ALBUMIN: 3
ALKALINE PHOSPHATASE: 96
ALT: 23
AST: 37
BILIRUBIN, DIRECT: 0.4
BILIRUBIN, INDIRECT: 1.1
BILIRUBIN, TOTAL: 1.5
GLOBULIN: 2.6
PROTEIN, TOTAL: 5.6

## 2022-07-12 ENCOUNTER — OFFICE VISIT (OUTPATIENT)
Dept: URBAN - METROPOLITAN AREA CLINIC 68 | Facility: CLINIC | Age: 72
End: 2022-07-12